# Patient Record
Sex: FEMALE | Race: WHITE | NOT HISPANIC OR LATINO | URBAN - METROPOLITAN AREA
[De-identification: names, ages, dates, MRNs, and addresses within clinical notes are randomized per-mention and may not be internally consistent; named-entity substitution may affect disease eponyms.]

---

## 2023-02-07 ENCOUNTER — OFFICE (OUTPATIENT)
Dept: URBAN - METROPOLITAN AREA CLINIC 67 | Facility: CLINIC | Age: 78
End: 2023-02-07
Payer: MEDICARE

## 2023-02-07 VITALS
HEART RATE: 66 BPM | HEIGHT: 59 IN | DIASTOLIC BLOOD PRESSURE: 78 MMHG | SYSTOLIC BLOOD PRESSURE: 122 MMHG | OXYGEN SATURATION: 97 % | WEIGHT: 144 LBS

## 2023-02-07 DIAGNOSIS — Z87.11 PERSONAL HISTORY OF PEPTIC ULCER DISEASE: ICD-10-CM

## 2023-02-07 DIAGNOSIS — R93.3 ABNORMAL FINDINGS ON DIAGNOSTIC IMAGING OF OTHER PARTS OF DI: ICD-10-CM

## 2023-02-07 DIAGNOSIS — R10.13 EPIGASTRIC PAIN: ICD-10-CM

## 2023-02-07 PROCEDURE — 99204 OFFICE O/P NEW MOD 45 MIN: CPT | Performed by: INTERNAL MEDICINE

## 2023-02-07 NOTE — SERVICENOTES
I discussed with them that we will tentatively plan for an EGD as the next step and I will get a copy of her CT scan and most recent blood work from Dr. Christie' office.
In the meantime, I will keep her on the Omeprazole 20mg/day.

## 2023-02-07 NOTE — SERVICEHPINOTES
The patient is seen today for folow-up regarding a personal history of ulcer disease and history of iron deficiency anemia.An EGD done on 8/2019, secondary to iron deficiency anemia and hemoccult positive stool, was remarkable for an ulcer/erosion in the fundus, a 20mm semi-sessile prepyloric polyp with surface erosions and oozing of red blood and a 10mm nodule in the duodenal bulb.brBiopsies of the fundus erosion/ulcer were positive for intestinal metaplasia but no evidence of H. pylori infection. Biopsies of the prepyloric polyp demonstrated it to be a benign hyperplastic polyp and biopsies of the duodenal bulb nodule demonstrated changes consistent with duodenitis without dysplasia. Biopsies of the post-bulbar duodenum were without evidence of celiac disease. She was placed on Omeprazole 20mg/day and a follow-up EGD done in 12/2019 demonstrated interval resolution of the previously seen fundus erosion/ulcer. Biopsies from the pylorus demonstrated no evidence of H. pylori infection. A colonoscopy done in 8/2019 was remarkable for diverticulosis and mild internal hemorrhoids. Today, she and her  report that she recently underwent a CT scan through Dr. Christie' office secondary to epigastric/RUQ abdominal discomfort. She states that the episodes are a cramp-like discomfort and usually occur with coughing or when turning in a certain direction. Due to the concern for a recurrent hernia, she underwent the CT scan. Per their report, it did not show evidence of a recurrent hernia but did show findings concerning for a recurrent ulcer (that report is forthcoming).
br She denies any GI tract bleeding symptoms or NSAID use and remains on a daily PPI. remigio

## 2023-02-24 ENCOUNTER — AMBULATORY SURGICAL CENTER (OUTPATIENT)
Dept: URBAN - METROPOLITAN AREA SURGERY 19 | Facility: SURGERY | Age: 78
End: 2023-02-24
Payer: MEDICARE

## 2023-02-24 ENCOUNTER — OFFICE (OUTPATIENT)
Dept: URBAN - METROPOLITAN AREA PATHOLOGY 24 | Facility: PATHOLOGY | Age: 78
End: 2023-02-24
Payer: MEDICARE

## 2023-02-24 DIAGNOSIS — K31.7 POLYP OF STOMACH AND DUODENUM: ICD-10-CM

## 2023-02-24 LAB
RELEVANT H&P ENDOSCOPY: (no result)
RELEVANT H&P ENDOSCOPY: (no result)

## 2023-02-24 PROCEDURE — 88341 IMHCHEM/IMCYTCHM EA ADD ANTB: CPT | Performed by: STUDENT IN AN ORGANIZED HEALTH CARE EDUCATION/TRAINING PROGRAM

## 2023-02-24 PROCEDURE — 43239 EGD BIOPSY SINGLE/MULTIPLE: CPT | Performed by: INTERNAL MEDICINE

## 2023-02-24 PROCEDURE — 88305 TISSUE EXAM BY PATHOLOGIST: CPT | Performed by: STUDENT IN AN ORGANIZED HEALTH CARE EDUCATION/TRAINING PROGRAM

## 2023-02-24 PROCEDURE — 88342 IMHCHEM/IMCYTCHM 1ST ANTB: CPT | Mod: 59 | Performed by: STUDENT IN AN ORGANIZED HEALTH CARE EDUCATION/TRAINING PROGRAM

## 2023-02-24 PROCEDURE — 88313 SPECIAL STAINS GROUP 2: CPT | Performed by: STUDENT IN AN ORGANIZED HEALTH CARE EDUCATION/TRAINING PROGRAM
